# Patient Record
Sex: FEMALE | Race: ASIAN | NOT HISPANIC OR LATINO | Employment: FULL TIME | URBAN - METROPOLITAN AREA
[De-identification: names, ages, dates, MRNs, and addresses within clinical notes are randomized per-mention and may not be internally consistent; named-entity substitution may affect disease eponyms.]

---

## 2019-09-30 ENCOUNTER — OFFICE VISIT (OUTPATIENT)
Dept: MEDICAL GROUP | Age: 54
End: 2019-09-30
Payer: COMMERCIAL

## 2019-09-30 VITALS
TEMPERATURE: 98.4 F | OXYGEN SATURATION: 96 % | HEART RATE: 75 BPM | HEIGHT: 59 IN | DIASTOLIC BLOOD PRESSURE: 72 MMHG | BODY MASS INDEX: 24.8 KG/M2 | WEIGHT: 123 LBS | SYSTOLIC BLOOD PRESSURE: 112 MMHG

## 2019-09-30 DIAGNOSIS — Z12.11 COLON CANCER SCREENING: ICD-10-CM

## 2019-09-30 DIAGNOSIS — H61.23 BILATERAL IMPACTED CERUMEN: ICD-10-CM

## 2019-09-30 DIAGNOSIS — Z12.31 ENCOUNTER FOR SCREENING MAMMOGRAM FOR BREAST CANCER: ICD-10-CM

## 2019-09-30 DIAGNOSIS — Z00.00 PE (PHYSICAL EXAM), ANNUAL: Primary | ICD-10-CM

## 2019-09-30 DIAGNOSIS — Z23 NEED FOR VACCINATION: ICD-10-CM

## 2019-09-30 PROCEDURE — 90715 TDAP VACCINE 7 YRS/> IM: CPT | Performed by: FAMILY MEDICINE

## 2019-09-30 PROCEDURE — 99386 PREV VISIT NEW AGE 40-64: CPT | Mod: 25 | Performed by: FAMILY MEDICINE

## 2019-09-30 PROCEDURE — 69210 REMOVE IMPACTED EAR WAX UNI: CPT | Performed by: FAMILY MEDICINE

## 2019-09-30 PROCEDURE — 90471 IMMUNIZATION ADMIN: CPT | Performed by: FAMILY MEDICINE

## 2019-09-30 SDOH — HEALTH STABILITY: MENTAL HEALTH: HOW OFTEN DO YOU HAVE A DRINK CONTAINING ALCOHOL?: NEVER

## 2019-09-30 ASSESSMENT — PATIENT HEALTH QUESTIONNAIRE - PHQ9: CLINICAL INTERPRETATION OF PHQ2 SCORE: 0

## 2019-10-01 NOTE — PROGRESS NOTES
CC: establish care     HPI:     Ashanti Maurer is a 54 y.o. female, new patient to the clinic and would like to establish care.       Patient is a healthy 54 y.o. Female with no major medical or surgical history.   Patient denies any familial history of cancer, stroke, or Diabetes.   Patient denies any history of tobacco, illicit drug, or alcohol abuse. Patient has an obstetric history of  with healthy 17 year old daughter. She is not sexually active. Patient does not take any medications currently. She is doing well. She does not have any concerns today.     She is due for pap, mammogram, and colon cancer screening.         Current medicines (including changes today)  No current outpatient medications on file.     No current facility-administered medications for this visit.      She  has no past medical history on file.  She  has no past surgical history on file.  Social History     Tobacco Use   • Smoking status: Never Smoker   • Smokeless tobacco: Never Used   Substance Use Topics   • Alcohol use: Never     Frequency: Never   • Drug use: Never     Social History     Social History Narrative   • Not on file     Family History   Problem Relation Age of Onset   • No Known Problems Mother    • No Known Problems Father    • No Known Problems Sister    • No Known Problems Brother      No family status information on file.       I personally reviewed patient's problem list, allergies, medications, family hx, social hx with patient and update [facility].     REVIEW OF SYSTEMS:  CONSTITUTIONAL:  Denies night sweats, fatigue, malaise, lethargy, fever or chills.  RESPIRATORY:  Denies cough, wheeze, hemoptysis, or shortness of breath.  CARDIOVASCULAR:  Denies chest pains, palpitations, pedal edema  GASTROINTESTINAL:  Denies abdominal pain, nausea or vomiting, diarrhea, constipation, hematemesis, hematochezia, melena.  GENITOURINARY:  Denies urinary urgency, frequency, dysuria, or hematuria.  No obstructive symptoms.  Denies  "unusual discharge.    All other systems reviewed and are negative     Objective:     /72 (BP Location: Right arm, Patient Position: Sitting, BP Cuff Size: Adult)   Pulse 75   Temp 36.9 °C (98.4 °F) (Temporal)   Ht 1.499 m (4' 11\")   Wt 55.8 kg (123 lb)   SpO2 96%  Body mass index is 24.84 kg/m².  Physical Exam:    Constitutional: Awake, alert, in no apparent distress.  Skin: Warm, dry, good turgor, no rashes/jaundice in visible areas.  Eye: PERRL, intact EOM, conjunctiva clear, lids normal.  ENMT: nasal & oral mucosa wnl, lips without lesions, good dentition, oropharynx clear.   - Right TM with severe cerumen impaction. Left TM with mild cerumen impaction.   Neck: Trachea midline, no masses, no thyromegaly. No cervical or supraclavicular lymphadenopathy.  Respiratory: Unlabored respiratory effort, lungs clear to auscultation, no wheezes, no rales.  Cardiovascular: Normal S1, S2, no murmur, no rubs, no gallops, no pedal edema.  Psych: Alert and oriented x3, affect and mood wnl, intact judgement and insight.       Assessment and Plan:   The following treatment plan was discussed    1. Encounter for screening mammogram for breast cancer  - MA-SCREEN MAMMO W/CAD-BILAT; Future    2. Colon cancer screening  - REFERRAL TO GI FOR COLONOSCOPY    3. Need for vaccination  - Tdap Vaccine =>8YO IM    4. Bilateral impacted cerumen  - ear lavage, see procedure note below    5. PE (physical exam), annual  General counseling provided, topics might include: diet, exercise, vitamin supplement, mental health, sleep, stress management, pap, mammogram, colonoscopy and vaccine recommendations.      - Comp Metabolic Panel; Future  - CBC WITH DIFFERENTIAL; Future  - HEMOGLOBIN A1C; Future  - Lipid Profile; Future     Procedure note: ear wax removal.   bilateral ear(s) partially irrigated by MA. I personally removed the rest of ear wax using a lighted curette pt tolerated the procedure well, no immediate complication noted.  "       Es Mcdonough M.D.    Records requested.  Followup: Return for Pap.    Please note that this dictation was created using voice recognition software and/or scribes. I have made every reasonable attempt to correct obvious errors, but I expect that there are errors of grammar and possibly content that I did not discover before finalizing the note.     Ed RAI (Scribe), am scribing for, and in the presence of, Es Mcdonough M.D.    Electronically signed by: Ed Clay (Scribe), 9/30/2019    IEs M.D. personally performed the services described in this documentation, as scribed by Ed Clay in my presence, and it is both accurate and complete.

## 2019-10-07 ENCOUNTER — HOSPITAL ENCOUNTER (OUTPATIENT)
Dept: LAB | Facility: MEDICAL CENTER | Age: 54
End: 2019-10-07
Attending: FAMILY MEDICINE
Payer: COMMERCIAL

## 2019-10-07 DIAGNOSIS — Z00.00 PE (PHYSICAL EXAM), ANNUAL: ICD-10-CM

## 2019-10-07 LAB
ALBUMIN SERPL BCP-MCNC: 4.9 G/DL (ref 3.2–4.9)
ALBUMIN/GLOB SERPL: 1.5 G/DL
ALP SERPL-CCNC: 57 U/L (ref 30–99)
ALT SERPL-CCNC: 22 U/L (ref 2–50)
ANION GAP SERPL CALC-SCNC: 10 MMOL/L (ref 0–11.9)
AST SERPL-CCNC: 22 U/L (ref 12–45)
BASOPHILS # BLD AUTO: 1 % (ref 0–1.8)
BASOPHILS # BLD: 0.07 K/UL (ref 0–0.12)
BILIRUB SERPL-MCNC: 0.7 MG/DL (ref 0.1–1.5)
BUN SERPL-MCNC: 14 MG/DL (ref 8–22)
CALCIUM SERPL-MCNC: 9.5 MG/DL (ref 8.5–10.5)
CHLORIDE SERPL-SCNC: 102 MMOL/L (ref 96–112)
CHOLEST SERPL-MCNC: 275 MG/DL (ref 100–199)
CO2 SERPL-SCNC: 26 MMOL/L (ref 20–33)
CREAT SERPL-MCNC: 0.65 MG/DL (ref 0.5–1.4)
EOSINOPHIL # BLD AUTO: 0.06 K/UL (ref 0–0.51)
EOSINOPHIL NFR BLD: 0.8 % (ref 0–6.9)
ERYTHROCYTE [DISTWIDTH] IN BLOOD BY AUTOMATED COUNT: 37.6 FL (ref 35.9–50)
FASTING STATUS PATIENT QL REPORTED: NORMAL
GLOBULIN SER CALC-MCNC: 3.2 G/DL (ref 1.9–3.5)
GLUCOSE SERPL-MCNC: 77 MG/DL (ref 65–99)
HCT VFR BLD AUTO: 45.8 % (ref 37–47)
HDLC SERPL-MCNC: 48 MG/DL
HGB BLD-MCNC: 15.1 G/DL (ref 12–16)
IMM GRANULOCYTES # BLD AUTO: 0.01 K/UL (ref 0–0.11)
IMM GRANULOCYTES NFR BLD AUTO: 0.1 % (ref 0–0.9)
LDLC SERPL CALC-MCNC: 202 MG/DL
LYMPHOCYTES # BLD AUTO: 2.06 K/UL (ref 1–4.8)
LYMPHOCYTES NFR BLD: 28.3 % (ref 22–41)
MCH RBC QN AUTO: 27.7 PG (ref 27–33)
MCHC RBC AUTO-ENTMCNC: 33 G/DL (ref 33.6–35)
MCV RBC AUTO: 83.9 FL (ref 81.4–97.8)
MONOCYTES # BLD AUTO: 0.61 K/UL (ref 0–0.85)
MONOCYTES NFR BLD AUTO: 8.4 % (ref 0–13.4)
NEUTROPHILS # BLD AUTO: 4.48 K/UL (ref 2–7.15)
NEUTROPHILS NFR BLD: 61.4 % (ref 44–72)
NRBC # BLD AUTO: 0 K/UL
NRBC BLD-RTO: 0 /100 WBC
PLATELET # BLD AUTO: 269 K/UL (ref 164–446)
PMV BLD AUTO: 10 FL (ref 9–12.9)
POTASSIUM SERPL-SCNC: 3.6 MMOL/L (ref 3.6–5.5)
PROT SERPL-MCNC: 8.1 G/DL (ref 6–8.2)
RBC # BLD AUTO: 5.46 M/UL (ref 4.2–5.4)
SODIUM SERPL-SCNC: 138 MMOL/L (ref 135–145)
TRIGL SERPL-MCNC: 124 MG/DL (ref 0–149)
WBC # BLD AUTO: 7.3 K/UL (ref 4.8–10.8)

## 2019-10-07 PROCEDURE — 85025 COMPLETE CBC W/AUTO DIFF WBC: CPT

## 2019-10-07 PROCEDURE — 83036 HEMOGLOBIN GLYCOSYLATED A1C: CPT

## 2019-10-07 PROCEDURE — 80061 LIPID PANEL: CPT

## 2019-10-07 PROCEDURE — 80053 COMPREHEN METABOLIC PANEL: CPT

## 2019-10-07 PROCEDURE — 36415 COLL VENOUS BLD VENIPUNCTURE: CPT

## 2019-10-08 DIAGNOSIS — E78.00 PURE HYPERCHOLESTEROLEMIA: ICD-10-CM

## 2019-10-08 DIAGNOSIS — R73.03 PREDIABETES: ICD-10-CM

## 2019-10-08 LAB
EST. AVERAGE GLUCOSE BLD GHB EST-MCNC: 117 MG/DL
HBA1C MFR BLD: 5.7 % (ref 0–5.6)

## 2019-10-17 ENCOUNTER — OFFICE VISIT (OUTPATIENT)
Dept: MEDICAL GROUP | Age: 54
End: 2019-10-17
Payer: COMMERCIAL

## 2019-10-17 ENCOUNTER — HOSPITAL ENCOUNTER (OUTPATIENT)
Facility: MEDICAL CENTER | Age: 54
End: 2019-10-17
Attending: FAMILY MEDICINE
Payer: COMMERCIAL

## 2019-10-17 VITALS
DIASTOLIC BLOOD PRESSURE: 88 MMHG | WEIGHT: 120.2 LBS | HEART RATE: 67 BPM | OXYGEN SATURATION: 95 % | HEIGHT: 59 IN | RESPIRATION RATE: 18 BRPM | SYSTOLIC BLOOD PRESSURE: 114 MMHG | TEMPERATURE: 98.2 F | BODY MASS INDEX: 24.23 KG/M2

## 2019-10-17 DIAGNOSIS — E78.01 FAMILIAL HYPERCHOLESTEROLEMIA: ICD-10-CM

## 2019-10-17 DIAGNOSIS — Z11.51 SPECIAL SCREENING EXAMINATION FOR HUMAN PAPILLOMAVIRUS (HPV): ICD-10-CM

## 2019-10-17 DIAGNOSIS — Z01.419 PAP SMEAR, LOW-RISK: ICD-10-CM

## 2019-10-17 DIAGNOSIS — R73.03 PREDIABETES: ICD-10-CM

## 2019-10-17 DIAGNOSIS — Z01.419 WELL WOMAN EXAM: ICD-10-CM

## 2019-10-17 DIAGNOSIS — Z12.4 ROUTINE CERVICAL SMEAR: ICD-10-CM

## 2019-10-17 PROCEDURE — 99396 PREV VISIT EST AGE 40-64: CPT | Mod: 25 | Performed by: FAMILY MEDICINE

## 2019-10-17 PROCEDURE — 99213 OFFICE O/P EST LOW 20 MIN: CPT | Mod: 25 | Performed by: FAMILY MEDICINE

## 2019-10-17 PROCEDURE — 88175 CYTOPATH C/V AUTO FLUID REDO: CPT

## 2019-10-17 PROCEDURE — 87624 HPV HI-RISK TYP POOLED RSLT: CPT

## 2019-10-17 ASSESSMENT — PAIN SCALES - GENERAL: PAINLEVEL: NO PAIN

## 2019-10-18 LAB
CYTOLOGY REG CYTOL: NORMAL
HPV HR 12 DNA CVX QL NAA+PROBE: NEGATIVE
HPV16 DNA SPEC QL NAA+PROBE: NEGATIVE
HPV18 DNA SPEC QL NAA+PROBE: NEGATIVE
SPECIMEN SOURCE: NORMAL

## 2019-10-18 NOTE — PROGRESS NOTES
"Subjective:   CC: WWE, pap, lab review     HPI:     Libertad Willard is a 54 y.o. female, established patient of the clinic, presents with the following concerns:     , not sexually active.   Contraception: neg   Hx of STD: neg   Hx of abnormal pap: neg   Pt is post-menopausal   Denies fever, chills, pelvic pain, dyspareunia, abnormal vaginal bleeding/discharge, genital rash.   Denies nipple bleeding, discharge, breast mass, familial/personal hx of breast/gyn malignancy.   Last mammogram: due, scheduled for 2019      Her recent blood test showed markedly elevated total cholesterol and LDL.  Patient reports that her number has always been very elevated even when she was in her 20s.  Patient does not know if her parents or her sisters have problem with hyperlipidemia.  Her A1c was 5.7 per most recent blood test.  Patient is asymptomatic.    Current medicines (including changes today)  Current Outpatient Medications   Medication Sig Dispense Refill   • Omega-3 Fatty Acids (FISH OIL PO) Take  by mouth.     • Calcium Carbonate-Vitamin D (CALTRATE 600+D PO) Take  by mouth.       No current facility-administered medications for this visit.      She  has no past medical history on file.    I personally reviewed patient's problem list, allergies, medications, family hx, social hx with patient and update [facility].     REVIEW OF SYSTEMS:  CONSTITUTIONAL:  Denies night sweats, fatigue, malaise, lethargy, fever or chills.  RESPIRATORY:  Denies cough, wheeze, hemoptysis, or shortness of breath.  CARDIOVASCULAR:  Denies chest pains, palpitations, pedal edema     Objective:     /88   Pulse 67   Temp 36.8 °C (98.2 °F) (Temporal)   Resp 18   Ht 1.499 m (4' 11\")   Wt 54.5 kg (120 lb 3.2 oz)   SpO2 95%  Body mass index is 24.28 kg/m².    Physical Exam:  Constitutional: awake, alert, in no distress.  Skin: Warm, dry, good turgor, no rashes, bruises, ulcers in visible areas.  Neck: Trachea midline, no masses, no " thyromegaly. No cervical or supraclavicular lymphadenopathy  Respiratory: Unlabored respiratory effort, lungs clear to auscultation, no wheezes, no rales.  Cardiovascular: Normal S1, S2, no murmur, no pedal edema.  Psych: Oriented x3, affect and mood wnl, intact judgement and insight.   GYN: Unremarkable external genitalia. Negative abnormal vaginal discharge or bleeding, no vaginal rash, cervix in mid position, no concerning lesions on cervix, no cervical motion tenderness, uterus mid position with normal size & shape, no adnexal fullness/mass appreciated on bimanual exam.     Assessment and Plan:   The following treatment plan was discussed    1. Prediabetes  Most recent A1c was 5.7.  Patient is asymptomatic.  Patient reports that she drinks 2 sodas per day.  She otherwise does not like to eat sweets.  She is active, but does not exercise regularly.  - Pt was counseled on dietary modification, weight loss   - Recommended moderate intensity exercise at least 30 minutes per day x 5 days per week.  - Follow up in 6 months.     2. Familial hypercholesterolemia  Markedly elevated total cholesterol and LDL per most recent blood test, likely familial.  Patient is not interested in pharmacotherapy.  I discussed risks and benefits of this condition with patient.  She would like to have repeat blood tests in 6 months.  She will decide on medication then.   -Appropriate dietary counseling discussed with patient  - recommended dietary modification, exercise, and weight loss.    - Lipid Profile; Future    3. Pap smear, low-risk  - THINPREP PAP WITH HPV    4. Routine cervical smear  - THINPREP PAP WITH HPV    5. Special screening examination for human papillomavirus (HPV)  - THINPREP PAP WITH HPV    6. Well woman exam  General counseling provided, topics might include: diet, exercise, vitamin supplement, mental health, sleep, stress management, pap, mammogram, colonoscopy and vaccine recommendations.          Es Mcdonough,  M.D.      Followup: Return in about 6 months (around 4/17/2020).    Please note that this dictation was created using voice recognition software. I have made every reasonable attempt to correct obvious errors, but I expect that there are errors of grammar and possibly content that I did not discover before finalizing the note.

## 2019-11-25 ENCOUNTER — HOSPITAL ENCOUNTER (OUTPATIENT)
Dept: RADIOLOGY | Facility: MEDICAL CENTER | Age: 54
End: 2019-11-25
Attending: FAMILY MEDICINE
Payer: COMMERCIAL

## 2019-11-25 DIAGNOSIS — Z12.31 ENCOUNTER FOR SCREENING MAMMOGRAM FOR BREAST CANCER: ICD-10-CM

## 2019-11-25 PROCEDURE — 77063 BREAST TOMOSYNTHESIS BI: CPT

## 2020-02-11 ENCOUNTER — OFFICE VISIT (OUTPATIENT)
Dept: MEDICAL GROUP | Age: 55
End: 2020-02-11
Payer: COMMERCIAL

## 2020-02-11 VITALS
SYSTOLIC BLOOD PRESSURE: 114 MMHG | TEMPERATURE: 97.9 F | DIASTOLIC BLOOD PRESSURE: 68 MMHG | BODY MASS INDEX: 23.18 KG/M2 | HEART RATE: 61 BPM | HEIGHT: 59 IN | OXYGEN SATURATION: 99 % | WEIGHT: 115 LBS

## 2020-02-11 DIAGNOSIS — S16.1XXA ACUTE STRAIN OF NECK MUSCLE, INITIAL ENCOUNTER: Primary | ICD-10-CM

## 2020-02-11 PROCEDURE — 99214 OFFICE O/P EST MOD 30 MIN: CPT | Performed by: FAMILY MEDICINE

## 2020-02-11 RX ORDER — IBUPROFEN 200 MG
200 TABLET ORAL EVERY 6 HOURS PRN
COMMUNITY
End: 2020-02-11

## 2020-02-11 ASSESSMENT — PATIENT HEALTH QUESTIONNAIRE - PHQ9: CLINICAL INTERPRETATION OF PHQ2 SCORE: 0

## 2020-02-11 NOTE — PROGRESS NOTES
Subjective:   CC: acute neck pain     HPI:     Libertad Willard is a 55 y.o. female who is an established patient of the clinic, presents with the following concerns:     Patient seen in clinic today for initial encounter of acute development of neck pain. Patient reports 3 nights ago she slept on her left side with a stack of 2 pillows on her soft mattress, and the next morning she woke up with posterior neck stiffness and pain side-to-side neck rotation. She reports pain is gradually improving with home stretching exercises, use of heating pad, and use of Biofreeze, however she continues to have limited range of motion due to pain. She works in a [employer] with extensive and prolonged neck flexion as this is the nature of her employment. Neck flexion causes exacerbation of her symptoms. She has bee off work for several days. She denies any weakness, numbness, tingling in her hands. No fever, chills, or skin rash. She denies injury/trauma. She has not tried any medications for her symptoms.     Current medicines (including changes today)  Current Outpatient Medications   Medication Sig Dispense Refill   • Garlic 10 MG Cap Take  by mouth.     • Cinnamon 500 MG Tab Take  by mouth.     • Diclofenac Sodium 1 % Gel Apply to 2-4 gram to affected area 4 times daily 100 g 1   • Calcium Carbonate-Vitamin D (CALTRATE 600+D PO) Take  by mouth.     • Omega-3 Fatty Acids (FISH OIL PO) Take  by mouth.       No current facility-administered medications for this visit.      She  has no past medical history on file.    I personally reviewed patient's problem list, allergies, medications, family hx, social hx with patient and update [facility].     REVIEW OF SYSTEMS:  CONSTITUTIONAL:  Denies night sweats, fatigue, malaise, lethargy, fever or chills.  RESPIRATORY:  Denies cough, wheeze, hemoptysis, or shortness of breath.  CARDIOVASCULAR:  Denies chest pains, palpitations, pedal edema     Objective:     /68 (BP Location: Right  "arm, Patient Position: Sitting, BP Cuff Size: Adult)   Pulse 61   Temp 36.6 °C (97.9 °F) (Temporal)   Ht 1.499 m (4' 11\")   Wt 52.2 kg (115 lb)   SpO2 99%  Body mass index is 23.23 kg/m².    Physical Exam:  Constitutional: awake, alert, in no distress.  Skin: Warm, dry, good turgor, no rashes, bruises, ulcers in visible areas.  Eye: conjunctiva clear, lids neg for edema or lesions.  ENMT: TM and auditory canals wnl. Oral and nasal mucosa wnl. Lips without lesions, good dentition, oropharynx clear.  Neck: Trachea midline, no masses, no thyromegaly. No cervical or supraclavicular lymphadenopathy  Respiratory: Unlabored respiratory effort, lungs clear to auscultation, no wheezes, no rales.  Cardiovascular: Normal S1, S2, no murmur, no pedal edema.  Msk:  - tight, tender posterior neck and upper back muscles bilaterally. Intact neuromuscular exam of upper extremities.   Psych: Oriented x3, affect and mood wnl, intact judgement and insight.       Assessment and Plan:   The following treatment plan was discussed:    1. Acute strain of neck muscle, initial encounter  Hx and exam are concerning for acute cervical strain. Plans:   - Diclofenac Sodium 1 % Gel; Apply to 2-4 gram to affected area 4 times daily  Dispense: 100 g; Refill: 1  - heat, massage  - activity modification  - home rehab exercises provided  - discussed proper neck/back support during sleep.     Es Mcdonough M.D.    Follow-up: Return for As needed.    Please note that this dictation was created using voice recognition software and/or scribes. I have made every reasonable attempt to correct obvious errors, but I expect that there are errors of grammar and possibly content that I did not discover before finalizing the note.    Hiwot RAI (Eduardo), am scribing for, and in the presence of, Es Mcdonough M.D.    Electronically signed by: Hiwot Shanks (Eduardo), 2/11/2020    Es RAI M.D. personally performed the services described in this " documentation, as scribed by Hiwot Shanks in my presence, and it is both accurate and complete.

## 2020-02-13 ENCOUNTER — TELEPHONE (OUTPATIENT)
Dept: MEDICAL GROUP | Age: 55
End: 2020-02-13

## 2020-02-13 NOTE — LETTER
February 13, 2020        Re: Libertad Kelly To    To whom it may concern,    My name is Es Mcdonough MD. I am taking care of Libertad Willard, who is suffering acute illness that requires rest and treatment. Please excuse Libertad Willard from working duties from 02/10/20 to 02/12/2020. She can return to work on 2/13/2020.     If you have any questions, please do not hesitate to call my office.     Best regards,               Es Mcdonough M.D.

## 2020-02-13 NOTE — TELEPHONE ENCOUNTER
1. Name: Libertad Kelly To      Call Back Number: **[phone] (home)   *   Patient approves a detailed voicemail message: yes    2. Disability paperwork received from She   requiring provider signature.     3. Paperwork letter to return to work    Patient needs a work note stating she was out Monday thru Wednesday and can return to work today. She would like to pick it up by 11:30 am during her lunch hour.

## 2020-04-29 LAB
CHOLEST SERPL-MCNC: 258 MG/DL (ref 100–199)
HBA1C MFR BLD: 5.9 % (ref 4.8–5.6)
HDLC SERPL-MCNC: 54 MG/DL
LABORATORY COMMENT REPORT: ABNORMAL
LDLC SERPL CALC-MCNC: 172 MG/DL (ref 0–99)
TRIGL SERPL-MCNC: 159 MG/DL (ref 0–149)
VLDLC SERPL CALC-MCNC: 32 MG/DL (ref 5–40)

## 2021-05-25 ENCOUNTER — OFFICE VISIT (OUTPATIENT)
Dept: MEDICAL GROUP | Age: 56
End: 2021-05-25
Payer: COMMERCIAL

## 2021-05-25 VITALS
OXYGEN SATURATION: 97 % | TEMPERATURE: 98 F | BODY MASS INDEX: 23.79 KG/M2 | SYSTOLIC BLOOD PRESSURE: 110 MMHG | HEART RATE: 77 BPM | HEIGHT: 59 IN | WEIGHT: 118 LBS | DIASTOLIC BLOOD PRESSURE: 64 MMHG

## 2021-05-25 DIAGNOSIS — E78.01 FAMILIAL HYPERCHOLESTEROLEMIA: ICD-10-CM

## 2021-05-25 DIAGNOSIS — Z00.00 PE (PHYSICAL EXAM), ANNUAL: Primary | ICD-10-CM

## 2021-05-25 DIAGNOSIS — R73.03 PREDIABETES: ICD-10-CM

## 2021-05-25 DIAGNOSIS — Z12.31 ENCOUNTER FOR SCREENING MAMMOGRAM FOR BREAST CANCER: ICD-10-CM

## 2021-05-25 PROCEDURE — 99396 PREV VISIT EST AGE 40-64: CPT | Performed by: FAMILY MEDICINE

## 2021-05-25 ASSESSMENT — PATIENT HEALTH QUESTIONNAIRE - PHQ9: CLINICAL INTERPRETATION OF PHQ2 SCORE: 0

## 2021-05-25 ASSESSMENT — FIBROSIS 4 INDEX: FIB4 SCORE: 0.96

## 2021-05-25 NOTE — PATIENT INSTRUCTIONS
Cholesterol Content in Foods  Cholesterol is a waxy, fat-like substance that helps to carry fat in the blood. The body needs cholesterol in small amounts, but too much cholesterol can cause damage to the arteries and heart.  Most people should eat less than 200 milligrams (mg) of cholesterol a day.  Foods with cholesterol    Cholesterol is found in animal-based foods, such as meat, seafood, and dairy. Generally, low-fat dairy and lean meats have less cholesterol than full-fat dairy and fatty meats. The milligrams of cholesterol per serving (mg per serving) of common cholesterol-containing foods are listed below.  Meat and other proteins  · Egg -- one large whole egg has 186 mg.  · Veal shank -- 4 oz has 141 mg.  · Lean ground turkey (93% lean) -- 4 oz has 118 mg.  · Fat-trimmed lamb loin -- 4 oz has 106 mg.  · Lean ground beef (90% lean) -- 4 oz has 100 mg.  · Lobster -- 3.5 oz has 90 mg.  · Pork loin chops -- 4 oz has 86 mg.  · Canned salmon -- 3.5 oz has 83 mg.  · Fat-trimmed beef top loin -- 4 oz has 78 mg.  · Frankfurter -- 1 ernie (3.5 oz) has 77 mg.  · Crab -- 3.5 oz has 71 mg.  · Roasted chicken without skin, white meat -- 4 oz has 66 mg.  · Light bologna -- 2 oz has 45 mg.  · Deli-cut turkey -- 2 oz has 31 mg.  · Canned tuna -- 3.5 oz has 31 mg.  · Gibbs -- 1 oz has 29 mg.  · Oysters and mussels (raw) -- 3.5 oz has 25 mg.  · Mackerel -- 1 oz has 22 mg.  · Trout -- 1 oz has 20 mg.  · Pork sausage -- 1 link (1 oz) has 17 mg.  · [place] -- 1 oz has 16 mg.  · Tilapia -- 1 oz has 14 mg.  Dairy  · Soft-serve ice cream -- ½ cup (4 oz) has 103 mg.  · Whole-milk yogurt -- 1 cup (8 oz) has 29 mg.  · Cheddar cheese -- 1 oz has 28 mg.  · American cheese -- 1 oz has 28 mg.  · Whole milk -- 1 cup (8 oz) has 23 mg.  · 2% milk -- 1 cup (8 oz) has 18 mg.  · Cream cheese -- 1 tablespoon (Tbsp) has 15 mg.  · Cottage cheese -- ½ cup (4 oz) has 14 mg.  · Low-fat (1%) milk -- 1 cup (8 oz) has 10 mg.  · Sour cream -- 1 Tbsp has 8.5  mg.  · Low-fat yogurt -- 1 cup (8 oz) has 8 mg.  · Nonfat Greek yogurt -- 1 cup (8 oz) has 7 mg.  · Half-and-half cream -- 1 Tbsp has 5 mg.  Fats and oils  · Cod liver oil -- 1 tablespoon (Tbsp) has 82 mg.  · Butter -- 1 Tbsp has 15 mg.  · Lard -- 1 Tbsp has 14 mg.  · Gibbs grease -- 1 Tbsp has 14 mg.  · Mayonnaise -- 1 Tbsp has 5-10 mg.  · Margarine -- 1 Tbsp has 3-10 mg.  Exact amounts of cholesterol in these foods may vary depending on specific ingredients and brands.  Foods without cholesterol  Most plant-based foods do not have cholesterol unless you combine them with a food that has cholesterol. Foods without cholesterol include:  · Grains and cereals.  · Vegetables.  · Fruits.  · Vegetable oils, such as olive, canola, and sunflower oil.  · Legumes, such as peas, beans, and lentils.  · Nuts and seeds.  · Egg whites.  Summary  · The body needs cholesterol in small amounts, but too much cholesterol can cause damage to the arteries and heart.  · Most people should eat less than 200 milligrams (mg) of cholesterol a day.  This information is not intended to replace advice given to you by your health care provider. Make sure you discuss any questions you have with your health care provider.  Document Released: 08/14/2018 Document Revised: 11/30/2018 Document Reviewed: 08/14/2018  [employer] Patient Education © 2020 [employer]

## 2021-05-25 NOTE — PROGRESS NOTES
"Subjective:   CC: annual PE     HPI:     Libertad Willard is a 56 y.o. female, established patient of the clinic, presents with the following concerns:     Patient has chronic hyperlipidemia, prediabetes.  Patient currently does not take any medication for the these conditions.  Hyperlipidemia appears to be familial.  Previous labs showed markedly elevated total cholesterol and LDL.  However, patient is not interested in pharmacotherapy.  She has been working on dietary and lifestyle modification to improve this condition.  She is active and try to exercise regularly.  Negative history of drug, alcohol, tobacco abuse.  Her weight is stable.  Negative history hypertension or diabetes or cardiovascular disease.    Pt is due for repeat labs. She declined shingle vac today.       Current medicines (including changes today)  Current Outpatient Medications   Medication Sig Dispense Refill   • Garlic 10 MG Cap Take  by mouth.     • Calcium Carbonate-Vitamin D (CALTRATE 600+D PO) Take  by mouth.       No current facility-administered medications for this visit.     She  has no past medical history on file.    I personally reviewed patient's problem list, allergies, medications, family hx, social hx with patient and update [facility].     REVIEW OF SYSTEMS:  CONSTITUTIONAL:  Denies night sweats, fatigue, malaise, lethargy, fever or chills.  RESPIRATORY:  Denies cough, wheeze, hemoptysis, or shortness of breath.  CARDIOVASCULAR:  Denies chest pains, palpitations, pedal edema     Objective:     /64 (BP Location: Right arm, Patient Position: Sitting, BP Cuff Size: Adult)   Pulse 77   Temp 36.7 °C (98 °F) (Temporal)   Ht 1.499 m (4' 11\")   Wt 53.5 kg (118 lb)   SpO2 97%  Body mass index is 23.83 kg/m².    Physical Exam:  Constitutional: awake, alert, in no distress.  Skin: Warm, dry, good turgor, no rashes, bruises, ulcers in visible areas.  Eye: conjunctiva clear, lids neg for edema or lesions.  ENMT: TM and auditory " canals wnl.    Neck: Trachea midline, no masses, no thyromegaly. No cervical or supraclavicular lymphadenopathy  Respiratory: Unlabored respiratory effort, lungs clear to auscultation, no wheezes, no rales.  Cardiovascular: Normal S1, S2, no murmur, no pedal edema.  Abdomen: Soft, non-tender to palpation, active BS, no hernia, no hepatosplenomegaly, negative rebound or guarding.   Psych: Oriented x3, affect and mood wnl, intact judgement and insight.       Assessment and Plan:   The following treatment plan was discussed    1. Prediabetes  - Dietary/lifestyle modification and weight loss    - HEMOGLOBIN A1C; Future    2. Familial hypercholesterolemia_declined meds  Chronic, previously labs showed markedly elevated total cholesterol and LDL.   She historically is not interested in pharmacotherapy.   She has been working on diet and lifestyle modification.   BMI 23.83 and stable.   Will order repeat labs for reassessment.   Dietary/lifestyle modification recommended and discussed.   - Lipid Profile; Future    3. Encounter for screening mammogram for breast cancer  - MA-SCREENING MAMMO BILAT W/CAD; Future    4. PE (physical exam), annual  General health and wellness counseling provided.      Vaccines up-to-date   - CBC WITH DIFFERENTIAL; Future  - Comp Metabolic Panel; Future  - HEMOGLOBIN A1C; Future  - Lipid Profile; Future      Ly SULEIMAN Mcdonough M.D.      Followup: Return in about 1 year (around 5/25/2022) for Multiple issues, annual PE.    Please note that this dictation was created using voice recognition software. I have made every reasonable attempt to correct obvious errors, but I expect that there are errors of grammar and possibly content that I did not discover before finalizing the note.

## 2021-06-11 ENCOUNTER — TELEPHONE (OUTPATIENT)
Dept: MEDICAL GROUP | Age: 56
End: 2021-06-11

## 2021-06-11 LAB
ALBUMIN SERPL-MCNC: 4.5 G/DL (ref 3.8–4.9)
ALBUMIN/GLOB SERPL: 1.8 {RATIO} (ref 1.2–2.2)
ALP SERPL-CCNC: 72 IU/L (ref 48–121)
ALT SERPL-CCNC: 27 IU/L (ref 0–32)
AST SERPL-CCNC: 24 IU/L (ref 0–40)
BASOPHILS # BLD AUTO: 0.1 X10E3/UL (ref 0–0.2)
BASOPHILS NFR BLD AUTO: 1 %
BILIRUB SERPL-MCNC: 0.7 MG/DL (ref 0–1.2)
BUN SERPL-MCNC: 13 MG/DL (ref 6–24)
BUN/CREAT SERPL: 22 (ref 9–23)
CALCIUM SERPL-MCNC: 9.4 MG/DL (ref 8.7–10.2)
CHLORIDE SERPL-SCNC: 104 MMOL/L (ref 96–106)
CHOLEST SERPL-MCNC: 271 MG/DL (ref 100–199)
CO2 SERPL-SCNC: 22 MMOL/L (ref 20–29)
CREAT SERPL-MCNC: 0.6 MG/DL (ref 0.57–1)
EOSINOPHIL # BLD AUTO: 0.1 X10E3/UL (ref 0–0.4)
EOSINOPHIL NFR BLD AUTO: 1 %
ERYTHROCYTE [DISTWIDTH] IN BLOOD BY AUTOMATED COUNT: 12.9 % (ref 11.7–15.4)
GLOBULIN SER CALC-MCNC: 2.5 G/DL (ref 1.5–4.5)
GLUCOSE SERPL-MCNC: 89 MG/DL (ref 65–99)
HBA1C MFR BLD: 5.8 % (ref 4.8–5.6)
HCT VFR BLD AUTO: 46.3 % (ref 34–46.6)
HDLC SERPL-MCNC: 50 MG/DL
HGB BLD-MCNC: 15.1 G/DL (ref 11.1–15.9)
IMM GRANULOCYTES # BLD AUTO: 0 X10E3/UL (ref 0–0.1)
IMM GRANULOCYTES NFR BLD AUTO: 0 %
IMMATURE CELLS  115398: ABNORMAL
LABORATORY COMMENT REPORT: ABNORMAL
LDLC SERPL CALC-MCNC: 195 MG/DL (ref 0–99)
LYMPHOCYTES # BLD AUTO: 1.5 X10E3/UL (ref 0.7–3.1)
LYMPHOCYTES NFR BLD AUTO: 25 %
MCH RBC QN AUTO: 27.5 PG (ref 26.6–33)
MCHC RBC AUTO-ENTMCNC: 32.6 G/DL (ref 31.5–35.7)
MCV RBC AUTO: 84 FL (ref 79–97)
MONOCYTES # BLD AUTO: 0.6 X10E3/UL (ref 0.1–0.9)
MONOCYTES NFR BLD AUTO: 10 %
MORPHOLOGY BLD-IMP: ABNORMAL
NEUTROPHILS # BLD AUTO: 3.9 X10E3/UL (ref 1.4–7)
NEUTROPHILS NFR BLD AUTO: 63 %
NRBC BLD AUTO-RTO: ABNORMAL %
PLATELET # BLD AUTO: 251 X10E3/UL (ref 150–450)
POTASSIUM SERPL-SCNC: 3.9 MMOL/L (ref 3.5–5.2)
PROT SERPL-MCNC: 7 G/DL (ref 6–8.5)
RBC # BLD AUTO: 5.5 X10E6/UL (ref 3.77–5.28)
SODIUM SERPL-SCNC: 139 MMOL/L (ref 134–144)
TRIGL SERPL-MCNC: 144 MG/DL (ref 0–149)
VLDLC SERPL CALC-MCNC: 26 MG/DL (ref 5–40)
WBC # BLD AUTO: 6.1 X10E3/UL (ref 3.4–10.8)

## 2021-06-11 NOTE — TELEPHONE ENCOUNTER
----- Message from Es Mcdonough M.D. sent at 6/11/2021  1:03 PM PDT -----  Please schedule appointment for lab review.

## 2021-06-11 NOTE — TELEPHONE ENCOUNTER
Phone Number Called: [phone] (home)       Call outcome: Did not leave a detailed message. Requested patient to call back.    Message: LVM 1st attempt

## 2021-06-22 ENCOUNTER — HOSPITAL ENCOUNTER (OUTPATIENT)
Dept: RADIOLOGY | Facility: MEDICAL CENTER | Age: 56
End: 2021-06-22
Attending: FAMILY MEDICINE
Payer: COMMERCIAL

## 2021-06-22 DIAGNOSIS — Z12.31 ENCOUNTER FOR SCREENING MAMMOGRAM FOR BREAST CANCER: ICD-10-CM

## 2021-06-22 PROCEDURE — 77063 BREAST TOMOSYNTHESIS BI: CPT

## 2021-06-29 ENCOUNTER — OFFICE VISIT (OUTPATIENT)
Dept: MEDICAL GROUP | Age: 56
End: 2021-06-29
Payer: COMMERCIAL

## 2021-06-29 VITALS
TEMPERATURE: 97.8 F | BODY MASS INDEX: 23.18 KG/M2 | HEIGHT: 59 IN | SYSTOLIC BLOOD PRESSURE: 100 MMHG | DIASTOLIC BLOOD PRESSURE: 58 MMHG | OXYGEN SATURATION: 96 % | HEART RATE: 68 BPM | WEIGHT: 115 LBS

## 2021-06-29 DIAGNOSIS — E78.01 FAMILIAL HYPERCHOLESTEROLEMIA: ICD-10-CM

## 2021-06-29 DIAGNOSIS — R73.03 PREDIABETES: ICD-10-CM

## 2021-06-29 PROCEDURE — 99214 OFFICE O/P EST MOD 30 MIN: CPT | Performed by: FAMILY MEDICINE

## 2021-06-29 RX ORDER — ATORVASTATIN CALCIUM 20 MG/1
20 TABLET, FILM COATED ORAL
Qty: 90 TABLET | Refills: 3 | Status: SHIPPED | OUTPATIENT
Start: 2021-06-29

## 2021-06-29 ASSESSMENT — FIBROSIS 4 INDEX: FIB4 SCORE: 1.01

## 2021-06-29 NOTE — PROGRESS NOTES
"Subjective:   CC: hyperlipidemia     HPI:     Libertad Willard is a 56 y.o. female, established patient of the clinic, presents with the following concerns:     Patient has chronic familial hyperlipidemia and prediabetes.  Patient currently does not take any medication.  Her recent labs show worsening lipid profile.  Patient denies any chest pain, shortness of breath, dyspnea on exertion, unusual pedal edema.  Patient has been working on dietary modification to improve cholesterol without success.  Negative history of hypertension, diabetes, or CVD.  Her BMI is 23.23.  She is active, but does not exercise regularly.  Her A1c improved from 5.9 to 5.8 per most recent labs.    Current medicines (including changes today)  Current Outpatient Medications   Medication Sig Dispense Refill   • atorvastatin (LIPITOR) 20 MG Tab Take 1 tablet by mouth at bedtime. 90 tablet 3   • Garlic 10 MG Cap Take  by mouth.     • Calcium Carbonate-Vitamin D (CALTRATE 600+D PO) Take  by mouth.       No current facility-administered medications for this visit.     She  has no past medical history on file.    I personally reviewed patient's problem list, allergies, medications, family hx, social hx with patient and update EPIC.     REVIEW OF SYSTEMS:  CONSTITUTIONAL:  Denies night sweats, fatigue, malaise, lethargy, fever or chills.  RESPIRATORY:  Denies cough, wheeze, hemoptysis, or shortness of breath.  CARDIOVASCULAR:  Denies chest pains, palpitations, pedal edema     Objective:     /58 (BP Location: Right arm, Patient Position: Sitting, BP Cuff Size: Adult)   Pulse 68   Temp 36.6 °C (97.8 °F) (Temporal)   Ht 1.499 m (4' 11\")   Wt 52.2 kg (115 lb)   SpO2 96%  Body mass index is 23.23 kg/m².    Physical Exam:  Constitutional: awake, alert, in no distress.  Skin: Warm, dry, good turgor, no rashes, bruises, ulcers in visible areas.  Eye: conjunctiva clear, lids neg for edema or lesions.  Respiratory: Unlabored respiratory effort, " lungs clear to auscultation, no wheezes, no rales.  Cardiovascular: Normal S1, S2, no murmur, no pedal edema.  Psych: Oriented x3, affect and mood wnl, intact judgement and insight.       Assessment and Plan:   The following treatment plan was discussed    1. Familial hypercholesterolemia  Markedly elevated total cholesterol and LDL persistently.   Failed dietary and lifestyle modification.   BMI 23.23. pt is active but does not exercise regularly.   - atorvastatin (LIPITOR) 20 MG Tab; Take 1 tablet by mouth at bedtime.  Dispense: 90 tablet; Refill: 3  - Comp Metabolic Panel; Future  - Lipid Profile; Future  - f/u in 6 months.     2. Prediabetes  Improving A1C from 5.9 to 5.8 per most recent labs.   - Dietary/lifestyle modification and weight loss    - HEMOGLOBIN A1C; Future      Ly SULEIMAN Mcdonough M.D.      Followup: Return in about 6 months (around 12/29/2021) for Multiple issues.    Please note that this dictation was created using voice recognition software. I have made every reasonable attempt to correct obvious errors, but I expect that there are errors of grammar and possibly content that I did not discover before finalizing the note.

## 2022-04-22 LAB
ALBUMIN SERPL-MCNC: 4.7 G/DL (ref 3.8–4.9)
ALBUMIN/GLOB SERPL: 1.6 {RATIO} (ref 1.2–2.2)
ALP SERPL-CCNC: 73 IU/L (ref 44–121)
ALT SERPL-CCNC: 64 IU/L (ref 0–32)
AST SERPL-CCNC: 44 IU/L (ref 0–40)
BILIRUB SERPL-MCNC: 0.6 MG/DL (ref 0–1.2)
BUN SERPL-MCNC: 10 MG/DL (ref 6–24)
BUN/CREAT SERPL: 16 (ref 9–23)
CALCIUM SERPL-MCNC: 9.3 MG/DL (ref 8.7–10.2)
CHLORIDE SERPL-SCNC: 106 MMOL/L (ref 96–106)
CHOLEST SERPL-MCNC: 294 MG/DL (ref 100–199)
CO2 SERPL-SCNC: 23 MMOL/L (ref 20–29)
CREAT SERPL-MCNC: 0.62 MG/DL (ref 0.57–1)
EGFRCR SERPLBLD CKD-EPI 2021: 104 ML/MIN/1.73
GLOBULIN SER CALC-MCNC: 2.9 G/DL (ref 1.5–4.5)
GLUCOSE SERPL-MCNC: 96 MG/DL (ref 65–99)
HBA1C MFR BLD: 5.9 % (ref 4.8–5.6)
HDLC SERPL-MCNC: 52 MG/DL
LABORATORY COMMENT REPORT: ABNORMAL
LDLC SERPL CALC-MCNC: 215 MG/DL (ref 0–99)
POTASSIUM SERPL-SCNC: 4 MMOL/L (ref 3.5–5.2)
PROT SERPL-MCNC: 7.6 G/DL (ref 6–8.5)
SODIUM SERPL-SCNC: 143 MMOL/L (ref 134–144)
TRIGL SERPL-MCNC: 147 MG/DL (ref 0–149)
VLDLC SERPL CALC-MCNC: 27 MG/DL (ref 5–40)

## 2022-04-25 ENCOUNTER — OFFICE VISIT (OUTPATIENT)
Dept: MEDICAL GROUP | Age: 57
End: 2022-04-25
Payer: COMMERCIAL

## 2022-04-25 VITALS
WEIGHT: 119 LBS | RESPIRATION RATE: 16 BRPM | HEIGHT: 59 IN | BODY MASS INDEX: 23.99 KG/M2 | HEART RATE: 70 BPM | DIASTOLIC BLOOD PRESSURE: 86 MMHG | SYSTOLIC BLOOD PRESSURE: 110 MMHG | OXYGEN SATURATION: 97 % | TEMPERATURE: 98.2 F

## 2022-04-25 DIAGNOSIS — H61.21 IMPACTED CERUMEN OF RIGHT EAR: ICD-10-CM

## 2022-04-25 DIAGNOSIS — R74.01 ELEVATED TRANSAMINASE LEVEL: ICD-10-CM

## 2022-04-25 DIAGNOSIS — E78.01 FAMILIAL HYPERCHOLESTEROLEMIA: ICD-10-CM

## 2022-04-25 PROCEDURE — 99214 OFFICE O/P EST MOD 30 MIN: CPT | Performed by: INTERNAL MEDICINE

## 2022-04-25 RX ORDER — AMPICILLIN TRIHYDRATE 250 MG
1200 CAPSULE ORAL 2 TIMES DAILY
Qty: 240 CAPSULE | Refills: 3 | Status: SHIPPED | OUTPATIENT
Start: 2022-04-25

## 2022-04-25 RX ORDER — AMPICILLIN TRIHYDRATE 250 MG
1200 CAPSULE ORAL 2 TIMES DAILY
Qty: 240 CAPSULE | Refills: 3 | Status: SHIPPED
Start: 2022-04-25 | End: 2022-04-25 | Stop reason: SDUPTHER

## 2022-04-25 ASSESSMENT — PATIENT HEALTH QUESTIONNAIRE - PHQ9: CLINICAL INTERPRETATION OF PHQ2 SCORE: 0

## 2022-04-25 ASSESSMENT — FIBROSIS 4 INDEX: FIB4 SCORE: 1.23

## 2022-04-26 NOTE — PROGRESS NOTES
Is a patient of Dr. Mcdonough's is unable to see the patient today.  He she is here for follow-up of her recent lab that was ordered.  She has a history of dyslipidemia unable to tolerate Lipitor at 20 mg daily.  She has not inclined to try different statin at a lower dose at this time.  She is convinced that any drug in the same class of statins will cause the same symptoms of severe myalgias.    She is otherwise feeling well.  Other complaint is some fullness in the low right ear.  Feels she might have wax in and wants that checked out.    Outpatient Medications Prior to Visit   Medication Sig Dispense Refill   • Calcium Carbonate-Vitamin D (CALTRATE 600+D PO) Take  by mouth.     • atorvastatin (LIPITOR) 20 MG Tab Take 1 tablet by mouth at bedtime. (Patient not taking: Reported on 4/25/2022) 90 tablet 3   • Garlic 10 MG Cap Take  by mouth.       No facility-administered medications prior to visit.     Allergies   Allergen Reactions   • Atorvastatin Myalgia     Patient Active Problem List    Diagnosis Date Noted   • Impacted cerumen of right ear 04/25/2022   • Familial hypercholesterolemia  10/08/2019   • Prediabetes 10/08/2019     Allergies   Allergen Reactions   • Atorvastatin Myalgia     No visits with results within 1 Month(s) from this visit.   Latest known visit with results is:   Orders Only on 06/10/2021   Component Date Value   • WBC 06/10/2021 6.1    • RBC 06/10/2021 5.50 (A)   • Hemoglobin 06/10/2021 15.1    • Hematocrit 06/10/2021 46.3    • MCV 06/10/2021 84    • MCH 06/10/2021 27.5    • MCHC 06/10/2021 32.6    • RDW 06/10/2021 12.9    • Platelet Count 06/10/2021 251    • Neutrophils-Polys 06/10/2021 63    • Lymphocytes 06/10/2021 25    • Monocytes 06/10/2021 10    • Eosinophils 06/10/2021 1    • Basophils 06/10/2021 1    • Immature Cells 06/10/2021 CANCELED    • Neutrophils (Absolute) 06/10/2021 3.9    • Lymphs (Absolute) 06/10/2021 1.5    • Monos (Absolute) 06/10/2021 0.6    • Eos (Absolute) 06/10/2021 0.1     • Baso (Absolute) 06/10/2021 0.1    • Immature Granulocytes 06/10/2021 0    • Immature Granulocytes (a* 06/10/2021 0.0    • Nucleated RBC 06/10/2021 CANCELED    • Comments-Diff 06/10/2021 CANCELED    • Glucose 06/10/2021 89    • Bun 06/10/2021 13    • Creatinine 06/10/2021 0.60    • GFR If Non  [employer]* 06/10/2021 103    • GFR If  06/10/2021 119    • Bun-Creatinine Ratio 06/10/2021 22    • Sodium 06/10/2021 139    • Potassium 06/10/2021 3.9    • Chloride 06/10/2021 104    • Co2 06/10/2021 22    • Calcium 06/10/2021 9.4    • Total Protein 06/10/2021 7.0    • Albumin 06/10/2021 4.5    • Globulin 06/10/2021 2.5    • A-G Ratio 06/10/2021 1.8    • Total Bilirubin 06/10/2021 0.7    • Alkaline Phosphatase 06/10/2021 72    • AST(SGOT) 06/10/2021 24    • ALT(SGPT) 06/10/2021 27    • Cholesterol,Tot 06/10/2021 271 (A)   • Triglycerides 06/10/2021 144    • HDL 06/10/2021 50    • VLDL Cholesterol Calc 06/10/2021 26    • LDL Chol Calc (NIH) 06/10/2021 195 (A)   • Comment: 06/10/2021 CANCELED    • Glycohemoglobin 06/10/2021 5.8 (A)   • Glucose 04/21/2022 96    • Bun 04/21/2022 10    • Creatinine 04/21/2022 0.62    • eGFR 04/21/2022 104    • Bun-Creatinine Ratio 04/21/2022 16    • Sodium 04/21/2022 143    • Potassium 04/21/2022 4.0    • Chloride 04/21/2022 106    • Co2 04/21/2022 23    • Calcium 04/21/2022 9.3    • Total Protein 04/21/2022 7.6    • Albumin 04/21/2022 4.7    • Globulin 04/21/2022 2.9    • A-G Ratio 04/21/2022 1.6    • Total Bilirubin 04/21/2022 0.6    • Alkaline Phosphatase 04/21/2022 73    • AST(SGOT) 04/21/2022 44 (A)   • ALT(SGPT) 04/21/2022 64 (A)   • Cholesterol,Tot 04/21/2022 294 (A)   • Triglycerides 04/21/2022 147    • HDL 04/21/2022 52    • VLDL Cholesterol Calc 04/21/2022 27    • LDL Chol Calc (NIH) 04/21/2022 215 (A)   • Comment: 04/21/2022 CANCELED    • Glycohemoglobin 04/21/2022 5.9 (A)      Lab Results   Component Value Date/Time    HBA1C 5.9 (H) 04/21/2022 08:14 AM    HBA1C 5.8  (H) 06/10/2021 05:35 AM    HBA1C 5.7 (H) 10/07/2019 03:57 PM     Lab Results   Component Value Date/Time    SODIUM 143 04/21/2022 08:14 AM    SODIUM 138 10/07/2019 03:57 PM    POTASSIUM 4.0 04/21/2022 08:14 AM    POTASSIUM 3.6 10/07/2019 03:57 PM    CHLORIDE 106 04/21/2022 08:14 AM    CHLORIDE 102 10/07/2019 03:57 PM    CO2 23 04/21/2022 08:14 AM    CO2 26 10/07/2019 03:57 PM    GLUCOSE 96 04/21/2022 08:14 AM    GLUCOSE 77 10/07/2019 03:57 PM    BUN 10 04/21/2022 08:14 AM    BUN 14 10/07/2019 03:57 PM    CREATININE 0.62 04/21/2022 08:14 AM    CREATININE 0.65 10/07/2019 03:57 PM    BUNCREATRAT 16 04/21/2022 08:14 AM    ALKPHOSPHAT 73 04/21/2022 08:14 AM    ALKPHOSPHAT 57 10/07/2019 03:57 PM    ASTSGOT 44 (H) 04/21/2022 08:14 AM    ASTSGOT 22 10/07/2019 03:57 PM    ALTSGPT 64 (H) 04/21/2022 08:14 AM    ALTSGPT 22 10/07/2019 03:57 PM    TBILIRUBIN 0.6 04/21/2022 08:14 AM    TBILIRUBIN 0.7 10/07/2019 03:57 PM     No results found for: INR  Lab Results   Component Value Date/Time    CHOLSTRLTOT 294 (H) 04/21/2022 08:14 AM    CHOLSTRLTOT 275 (H) 10/07/2019 03:57 PM     (H) 04/27/2020 07:31 AM     (H) 10/07/2019 03:57 PM    HDL 52 04/21/2022 08:14 AM    HDL 48 10/07/2019 03:57 PM    TRIGLYCERIDE 147 04/21/2022 08:14 AM    TRIGLYCERIDE 124 10/07/2019 03:57 PM       No results found for: TESTOSTERONE  No results found for: TSH  No results found for: FREET4  No results found for: URICACID  No components found for: VITB12  No results found for: 25HYDROXY'      Impression/plan.  1. Familial hypercholesterolemia not at goal.  She has not been taking anything of any significance to lower her cholesterol but review of her Huddy risk score shows that she is only at a 2% 10-year risk.  Advised on diet and exercise and trial of over-the-counter red yeast rice and recheck again in 3 to 6 months with PCP.          - Red Yeast Rice 600 MG Cap; Take 2 Capsules by mouth 2 times a day.  Dispense: 240 Capsule; Refill:  "3    2. Elevated transaminase level-this is a new problem.  Unclear etiology.  Probably fatty liver.  Rule out viral etiology.  Ultrasound the abdomen ordered as well as hepatitis panel and repeat labs in 3 months.  Follow-up with PCP.  Advised on high-protein low-carb diet      - US-ABDOMEN COMPLETE SURVEY; Future  - Comp Metabolic Panel; Future  - HEPATITIS PANEL ACUTE(4 COMPONENTS); Future    3. Impacted cerumen of right ear-the right ear canal just shows some flakes of earwax in a weblike fashion but the eardrum is well visualized and not a problem.  Is perfectly normal.  However she complains of fullness in the ear and would like something for her \"earwax\" I prescribed some Debrox for her and she will come back at her next office visit for reexamination.  She can self irrigate the canal under the showerhead.  Or use a gentle self administration of bulb syringe.  Warrant the use body temperature water for irrigation otherwise she will get extremely dizzy.     - carbamide peroxide (DEBROX) 6.5 % Solution; Administer 6 Drops into affected ear(s) 2 times a day. Administer drops in both ears.  Dispense: 15 mL; Refill: 0      "